# Patient Record
Sex: MALE | Race: WHITE | NOT HISPANIC OR LATINO | URBAN - METROPOLITAN AREA
[De-identification: names, ages, dates, MRNs, and addresses within clinical notes are randomized per-mention and may not be internally consistent; named-entity substitution may affect disease eponyms.]

---

## 2019-08-25 ENCOUNTER — EMERGENCY (EMERGENCY)
Facility: HOSPITAL | Age: 44
LOS: 0 days | Discharge: HOME | End: 2019-08-25
Admitting: EMERGENCY MEDICINE
Payer: COMMERCIAL

## 2019-08-25 VITALS
HEIGHT: 73 IN | SYSTOLIC BLOOD PRESSURE: 188 MMHG | DIASTOLIC BLOOD PRESSURE: 82 MMHG | TEMPERATURE: 98 F | RESPIRATION RATE: 18 BRPM | WEIGHT: 250 LBS | OXYGEN SATURATION: 98 % | HEART RATE: 93 BPM

## 2019-08-25 DIAGNOSIS — S61.011A LACERATION WITHOUT FOREIGN BODY OF RIGHT THUMB WITHOUT DAMAGE TO NAIL, INITIAL ENCOUNTER: ICD-10-CM
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
## 2019-08-25 PROCEDURE — 12001 RPR S/N/AX/GEN/TRNK 2.5CM/<: CPT

## 2019-08-25 PROCEDURE — 99282 EMERGENCY DEPT VISIT SF MDM: CPT | Mod: 25

## 2019-08-25 NOTE — ED PROVIDER NOTE - PROGRESS NOTE DETAILS
counseled needs to return for suture removal in 10 days. Reviewed all results and necessity for follow up. Counseled on red flags and to return for them.  Patient appears well on discharge.

## 2019-08-25 NOTE — ED ADULT NURSE NOTE - OBJECTIVE STATEMENT
45 y/o male brought in for laceration to right thumb. patient cut right thumb with . No bleeding to site at this time.

## 2019-08-25 NOTE — ED PROVIDER NOTE - PHYSICAL EXAMINATION
PHYSICAL EXAM:    GENERAL: Alert, appears stated age, well appearing, non-toxic  SKIN: Warm, pink and dry. MMM.   EYE: Normal lids/conjunctiva  ENT: Normal hearing, patent oropharynx   Pulm: Bilateral BS, normal resp effort, no wheezes, stridor, or retractions  CV: RRR, no M/R/G, 2+and = radial pulses  Mskel: no erythema, cyanosis, edema. no calf tenderness. R thumb with 2cm laceration over dorsal aspect. Clean laceration without discharge, active bleeding, change in color, change in sensation, decreased cap refill. wound explored in bloodless field after copious irrigation with 250cc sterile water, no FB, no tendon involvement. FROM throughout with each joint isolated.   Neuro: AAOx3, no sensory/motor deficits, 5/5 strength throughout. normal gait.

## 2019-08-25 NOTE — ED PROVIDER NOTE - CARE PROVIDER_API CALL
[facility] ED in 10 days,   Phone: (   )    -  Fax: (   )    -  Follow Up Time:

## 2019-08-25 NOTE — ED ADULT NURSE NOTE - CHPI ED NUR SYMPTOMS NEG
no rectal pain/no drainage/no blood in mucus/no vomiting/no bleeding at site/no purulent drainage/no redness/no chills/no fever

## 2019-08-25 NOTE — ED PROVIDER NOTE - OBJECTIVE STATEMENT
43 y/o M without PMH UTD on tetanus presents with nonpainful, not actively bleeding laceration to R thumb s/p cutting it with knife tonight. denies other injuries, decreased ROM, paraesthesias, change in color, swelling, warmth, FB, obvious deformity, large blood loss, lightheadedness, palpitations, LOC.

## 2019-08-25 NOTE — ED PROVIDER NOTE - PROVIDER TOKENS
FREE:[LAST:[[facility] ED in 10 days],PHONE:[(   )    -],FAX:[(   )    -]] No

## 2019-08-25 NOTE — ED PROVIDER NOTE - NS ED ROS FT
Review of Systems    Constitutional: (-) fever   Cardiovascular: (-) chest pain  Respiratory: (-) shortness of breath  Gastrointestinal: (-) vomiting  Integumentary: see hpi  Hematologic: (-) easy bruising

## 2019-08-25 NOTE — ED PROVIDER NOTE - NSFOLLOWUPINSTRUCTIONS_ED_ALL_ED_FT
Laceration Care, Adult  ImageA laceration is a cut that goes through all layers of the skin. The cut also goes into the tissue that is right under the skin. Some cuts heal on their own. Others need to be closed with stitches (sutures), staples, skin adhesive strips, or wound glue. Taking care of your cut lowers your risk of infection and helps your cut to heal better.    How to take care of your cut  For stitches or staples:     Keep the wound clean and dry.  If you were given a bandage (dressing), you should change it at least one time per day or as told by your doctor. You should also change it if it gets wet or dirty.  Keep the wound completely dry for the first 24 hours or as told by your doctor. After that time, you may take a shower or a bath. However, make sure that the wound is not soaked in water until after the stitches or staples have been removed.  Clean the wound one time each day or as told by your doctor:    Wash the wound with soap and water.  Rinse the wound with water until all of the soap comes off.  Pat the wound dry with a clean towel. Do not rub the wound.    After you clean the wound, put a thin layer of antibiotic ointment on it as told by your doctor. This ointment:    Helps to prevent infection.  Keeps the bandage from sticking to the wound.    Have your stitches or staples removed as told by your doctor.  If your doctor used skin adhesive strips:     Keep the wound clean and dry.  If you were given a bandage, you should change it at least one time per day or as told by your doctor. You should also change it if it gets dirty or wet.  Do not get the skin adhesive strips wet. You can take a shower or a bath, but be careful to keep the wound dry.  If the wound gets wet, pat it dry with a clean towel. Do not rub the wound.  Skin adhesive strips fall off on their own. You can trim the strips as the wound heals. Do not remove any strips that are still stuck to the wound. They will fall off after a while.  If your doctor used wound glue:     Try to keep your wound dry, but you may briefly wet it in the shower or bath. Do not soak the wound in water, such as by swimming.  After you take a shower or a bath, gently pat the wound dry with a clean towel. Do not rub the wound.  Do not do any activities that will make you really sweaty until the skin glue has fallen off on its own.  Do not apply liquid, cream, or ointment medicine to your wound while the skin glue is still on.  If you were given a bandage, you should change it at least one time per day or as told by your doctor. You should also change it if it gets dirty or wet.  If a bandage is placed over the wound, do not let the tape for the bandage touch the skin glue.  Do not pick at the glue. The skin glue usually stays on for 5–10 days. Then, it falls off of the skin.  General Instructions     To help prevent scarring, make sure to cover your wound with sunscreen whenever you are outside after stitches are removed, after adhesive strips are removed, or when wound glue stays in place and the wound is healed. Make sure to wear a sunscreen of at least 30 SPF.  Take over-the-counter and prescription medicines only as told by your doctor.  If you were given antibiotic medicine or ointment, take or apply it as told by your doctor. Do not stop using the antibiotic even if your wound is getting better.  Do not scratch or pick at the wound.  Keep all follow-up visits as told by your doctor. This is important.  Check your wound every day for signs of infection. Watch for:    Redness, swelling, or pain.  Fluid, blood, or pus.    Raise (elevate) the injured area above the level of your heart while you are sitting or lying down, if possible.  Get help if:  You got a tetanus shot and you have any of these problems at the injection site:    Swelling.  Very bad pain.  Redness.  Bleeding.    You have a fever.  A wound that was closed breaks open.  You notice a bad smell coming from your wound or your bandage.  You notice something coming out of the wound, such as wood or glass.  Medicine does not help your pain.  You have more redness, swelling, or pain at the site of your wound.  You have fluid, blood, or pus coming from your wound.  You notice a change in the color of your skin near your wound.  You need to change the bandage often because fluid, blood, or pus is coming from the wound.  You start to have a new rash.  You start to have numbness around the wound.  Get help right away if:  You have very bad swelling around the wound.  Your pain suddenly gets worse and is very bad.  You notice painful lumps near the wound or on skin that is anywhere on your body.  You have a red streak going away from your wound.  The wound is on your hand or foot and you cannot move a finger or toe like you usually can.  The wound is on your hand or foot and you notice that your fingers or toes look pale or bluish.  This information is not intended to replace advice given to you by your health care provider. Make sure you discuss any questions you have with your health care provider.

## 2023-08-11 NOTE — ED PROCEDURE NOTE - NS ED PROCEDURE ASSISTED BY
Please assist       Patient is requesting a medication refill - medication is on active list, but patient is requesting a change to the medication prescription.     Change requested: Dosage      RX Name and Dose: 25mg of Focalin        Duration: 90 days or 30 days      Full name of the provider who ordered the medication: Dr. Quijano     [facility] site name / Account # for provider: [facility]      Preferred Pharmacy: [id number] - [place], [place] - [address] ( [address]      The procedure was performed independently